# Patient Record
Sex: MALE | Race: BLACK OR AFRICAN AMERICAN | NOT HISPANIC OR LATINO | Employment: OTHER | URBAN - METROPOLITAN AREA
[De-identification: names, ages, dates, MRNs, and addresses within clinical notes are randomized per-mention and may not be internally consistent; named-entity substitution may affect disease eponyms.]

---

## 2019-12-05 ENCOUNTER — TELEPHONE (OUTPATIENT)
Dept: HEMATOLOGY/ONCOLOGY | Facility: CLINIC | Age: 74
End: 2019-12-05

## 2019-12-05 NOTE — TELEPHONE ENCOUNTER
Rec'd call from pt's wife requesting letter for [employer] to get spousal benefits due to agent orange exposure in his time in [place] and the possible link to his cancer/confirming cancer treatment. SW will send request to Dr. David to determine next steps and assist accordingly.

## 2019-12-10 ENCOUNTER — TELEPHONE (OUTPATIENT)
Dept: HEMATOLOGY/ONCOLOGY | Facility: CLINIC | Age: 74
End: 2019-12-10

## 2019-12-10 NOTE — TELEPHONE ENCOUNTER
Ashu contacted pt's spouse Mrs. Toussaint at [phone] to report to contact [facility] records for pt's record. Ashu provided Mrs. Toussaint with contact information to medical records. Mrs. Toussaint reports she has notes from Dr. David. She reports she is inquiring about Dr. David writing a letter stating pt's cancer could have been attributed to his time in the . Ashu reports she will have to request progress notes, labs or history and physical from medical records.